# Patient Record
Sex: MALE | Race: ASIAN
[De-identification: names, ages, dates, MRNs, and addresses within clinical notes are randomized per-mention and may not be internally consistent; named-entity substitution may affect disease eponyms.]

---

## 2021-09-21 PROBLEM — Z00.00 ENCOUNTER FOR PREVENTIVE HEALTH EXAMINATION: Status: ACTIVE | Noted: 2021-09-21

## 2021-09-23 ENCOUNTER — APPOINTMENT (OUTPATIENT)
Dept: UROLOGY | Facility: CLINIC | Age: 31
End: 2021-09-23
Payer: MEDICAID

## 2021-09-23 VITALS
RESPIRATION RATE: 16 BRPM | SYSTOLIC BLOOD PRESSURE: 85 MMHG | HEART RATE: 60 BPM | WEIGHT: 152 LBS | BODY MASS INDEX: 21.28 KG/M2 | DIASTOLIC BLOOD PRESSURE: 52 MMHG | HEIGHT: 70.87 IN | OXYGEN SATURATION: 96 % | TEMPERATURE: 98.2 F

## 2021-09-23 DIAGNOSIS — Z78.9 OTHER SPECIFIED HEALTH STATUS: ICD-10-CM

## 2021-09-23 DIAGNOSIS — F17.210 NICOTINE DEPENDENCE, CIGARETTES, UNCOMPLICATED: ICD-10-CM

## 2021-09-23 DIAGNOSIS — N20.0 CALCULUS OF KIDNEY: ICD-10-CM

## 2021-09-23 DIAGNOSIS — N13.2 HYDRONEPHROSIS WITH RENAL AND URETERAL CALCULOUS OBSTRUCTION: ICD-10-CM

## 2021-09-23 PROCEDURE — 99204 OFFICE O/P NEW MOD 45 MIN: CPT

## 2021-09-23 NOTE — HISTORY OF PRESENT ILLNESS
[FreeTextEntry1] : Patient is a 32 yo M who presents for nephrolithiasis.\par \par He reports ~6-7 yrs ago he had 5 procedures for a large R kidney stone.  He reports 4 ESWLs in [place], then returned to [place] and had PCNL in [place].  At that time he was found to have stones due to microhematuria work up.  He did not have flank pain.\par \par He comes in for recurrent nephrolithiasis.  He had occasional R sided discomfort, no severe flank pain.\par \par He did see multiple urologists in [place] in the past - he denies any prior 24 hr urine testing.\par \par Recent imaging CT at [facility] shows large 1.8 cm R pelvic stone with multiple other intrarenal stones, mild-mod hydro.  This was performed by Dr Mcmanus.\par \par Labs reviewed - UA with rbcs, wbcs.  Cr 0.87 [Dysuria] : no dysuria [Hematuria - Gross] : no gross hematuria [Flank Pain] : no flank pain [Fever] : no fever

## 2021-09-23 NOTE — ASSESSMENT
[FreeTextEntry1] : Patient is a 32 yo M who presents for R kidney stone.\par \par Imaging reviewed from [facility] - large R pelvic stone and multiple smaller stones.\par D/w pt that he should undergo PCNL surgery.\par D/w pt that I will refer to my stone specialist partner as this is a complex surgery.\par Also d/w pt that he should undergo metabolic evaluation as he is young with recurrent stones, d/w pt that he can undergo 24 hr urine testing after stones are treated/cleared.\par \par F/u with stone specialist Dr Dominguez